# Patient Record
Sex: FEMALE | Race: BLACK OR AFRICAN AMERICAN | Employment: OTHER | URBAN - METROPOLITAN AREA
[De-identification: names, ages, dates, MRNs, and addresses within clinical notes are randomized per-mention and may not be internally consistent; named-entity substitution may affect disease eponyms.]

---

## 2022-11-21 ENCOUNTER — HOSPITAL ENCOUNTER (EMERGENCY)
Age: 37
Discharge: HOME | End: 2022-11-22
Attending: STUDENT IN AN ORGANIZED HEALTH CARE EDUCATION/TRAINING PROGRAM

## 2022-11-21 ENCOUNTER — APPOINTMENT (OUTPATIENT)
Dept: ULTRASOUND IMAGING | Age: 37
End: 2022-11-21
Attending: PHYSICIAN ASSISTANT

## 2022-11-21 VITALS
BODY MASS INDEX: 28.62 KG/M2 | TEMPERATURE: 97.3 F | OXYGEN SATURATION: 100 % | HEIGHT: 67 IN | RESPIRATION RATE: 18 BRPM | WEIGHT: 182.32 LBS | SYSTOLIC BLOOD PRESSURE: 134 MMHG | HEART RATE: 61 BPM | DIASTOLIC BLOOD PRESSURE: 83 MMHG

## 2022-11-21 DIAGNOSIS — D25.9 UTERINE LEIOMYOMA, UNSPECIFIED LOCATION: ICD-10-CM

## 2022-11-21 DIAGNOSIS — R10.817 GENERALIZED ABDOMINAL TENDERNESS WITHOUT REBOUND TENDERNESS: Primary | ICD-10-CM

## 2022-11-21 LAB
BASOPHILS # BLD: 0 K/UL (ref 0–0.1)
BASOPHILS NFR BLD: 1 % (ref 0–1)
COMMENT, HOLDF: NORMAL
DIFFERENTIAL METHOD BLD: ABNORMAL
EOSINOPHIL # BLD: 0.3 K/UL (ref 0–0.4)
EOSINOPHIL NFR BLD: 5 % (ref 0–7)
ERYTHROCYTE [DISTWIDTH] IN BLOOD BY AUTOMATED COUNT: 14.8 % (ref 11.5–14.5)
HCG UR QL: NEGATIVE
HCT VFR BLD AUTO: 31.3 % (ref 35–47)
HGB BLD-MCNC: 9.7 G/DL (ref 11.5–16)
IMM GRANULOCYTES # BLD AUTO: 0 K/UL (ref 0–0.04)
IMM GRANULOCYTES NFR BLD AUTO: 0 % (ref 0–0.5)
LYMPHOCYTES # BLD: 2.4 K/UL (ref 0.8–3.5)
LYMPHOCYTES NFR BLD: 38 % (ref 12–49)
MCH RBC QN AUTO: 26.7 PG (ref 26–34)
MCHC RBC AUTO-ENTMCNC: 31 G/DL (ref 30–36.5)
MCV RBC AUTO: 86.2 FL (ref 80–99)
MONOCYTES # BLD: 0.4 K/UL (ref 0–1)
MONOCYTES NFR BLD: 6 % (ref 5–13)
NEUTS SEG # BLD: 3.1 K/UL (ref 1.8–8)
NEUTS SEG NFR BLD: 50 % (ref 32–75)
NRBC # BLD: 0 K/UL (ref 0–0.01)
NRBC BLD-RTO: 0 PER 100 WBC
PLATELET # BLD AUTO: 250 K/UL (ref 150–400)
PMV BLD AUTO: 12.3 FL (ref 8.9–12.9)
RBC # BLD AUTO: 3.63 M/UL (ref 3.8–5.2)
SAMPLES BEING HELD,HOLD: NORMAL
UR CULT HOLD, URHOLD: NORMAL
WBC # BLD AUTO: 6.3 K/UL (ref 3.6–11)

## 2022-11-21 PROCEDURE — 85025 COMPLETE CBC W/AUTO DIFF WBC: CPT

## 2022-11-21 PROCEDURE — 81025 URINE PREGNANCY TEST: CPT

## 2022-11-21 PROCEDURE — 81001 URINALYSIS AUTO W/SCOPE: CPT

## 2022-11-21 PROCEDURE — 76856 US EXAM PELVIC COMPLETE: CPT

## 2022-11-21 PROCEDURE — 80053 COMPREHEN METABOLIC PANEL: CPT

## 2022-11-21 PROCEDURE — 99285 EMERGENCY DEPT VISIT HI MDM: CPT

## 2022-11-22 ENCOUNTER — APPOINTMENT (OUTPATIENT)
Dept: CT IMAGING | Age: 37
End: 2022-11-22
Attending: PHYSICIAN ASSISTANT

## 2022-11-22 LAB
ALBUMIN SERPL-MCNC: 3.5 G/DL (ref 3.5–5)
ALBUMIN/GLOB SERPL: 0.9 {RATIO} (ref 1.1–2.2)
ALP SERPL-CCNC: 61 U/L (ref 45–117)
ALT SERPL-CCNC: 13 U/L (ref 12–78)
ANION GAP SERPL CALC-SCNC: 6 MMOL/L (ref 5–15)
APPEARANCE UR: CLEAR
AST SERPL-CCNC: 14 U/L (ref 15–37)
BACTERIA URNS QL MICRO: NEGATIVE /HPF
BILIRUB SERPL-MCNC: 0.4 MG/DL (ref 0.2–1)
BILIRUB UR QL: NEGATIVE
BUN SERPL-MCNC: 7 MG/DL (ref 6–20)
BUN/CREAT SERPL: 10 (ref 12–20)
CALCIUM SERPL-MCNC: 8.9 MG/DL (ref 8.5–10.1)
CHLORIDE SERPL-SCNC: 110 MMOL/L (ref 97–108)
CLUE CELLS VAG QL WET PREP: NORMAL
CO2 SERPL-SCNC: 25 MMOL/L (ref 21–32)
COLOR UR: ABNORMAL
CREAT SERPL-MCNC: 0.7 MG/DL (ref 0.55–1.02)
EPITH CASTS URNS QL MICRO: ABNORMAL /LPF
GLOBULIN SER CALC-MCNC: 3.8 G/DL (ref 2–4)
GLUCOSE SERPL-MCNC: 99 MG/DL (ref 65–100)
GLUCOSE UR STRIP.AUTO-MCNC: NEGATIVE MG/DL
HGB UR QL STRIP: ABNORMAL
HYALINE CASTS URNS QL MICRO: ABNORMAL /LPF (ref 0–5)
KETONES UR QL STRIP.AUTO: ABNORMAL MG/DL
LEUKOCYTE ESTERASE UR QL STRIP.AUTO: NEGATIVE
NITRITE UR QL STRIP.AUTO: NEGATIVE
PH UR STRIP: 5 [PH] (ref 5–8)
POTASSIUM SERPL-SCNC: 3.6 MMOL/L (ref 3.5–5.1)
PROT SERPL-MCNC: 7.3 G/DL (ref 6.4–8.2)
PROT UR STRIP-MCNC: NEGATIVE MG/DL
RBC #/AREA URNS HPF: ABNORMAL /HPF (ref 0–5)
SODIUM SERPL-SCNC: 141 MMOL/L (ref 136–145)
SP GR UR REFRACTOMETRY: 1.03
T VAGINALIS VAG QL WET PREP: NORMAL
UROBILINOGEN UR QL STRIP.AUTO: 0.2 EU/DL (ref 0.2–1)
WBC URNS QL MICRO: ABNORMAL /HPF (ref 0–4)

## 2022-11-22 PROCEDURE — 87491 CHLMYD TRACH DNA AMP PROBE: CPT

## 2022-11-22 PROCEDURE — 87210 SMEAR WET MOUNT SALINE/INK: CPT

## 2022-11-22 PROCEDURE — 74011000636 HC RX REV CODE- 636: Performed by: RADIOLOGY

## 2022-11-22 PROCEDURE — 74177 CT ABD & PELVIS W/CONTRAST: CPT

## 2022-11-22 RX ORDER — IBUPROFEN 800 MG/1
800 TABLET ORAL
Qty: 20 TABLET | Refills: 0 | Status: SHIPPED | OUTPATIENT
Start: 2022-11-22 | End: 2022-11-22 | Stop reason: SDUPTHER

## 2022-11-22 RX ORDER — IBUPROFEN 800 MG/1
800 TABLET ORAL
Qty: 20 TABLET | Refills: 0 | Status: SHIPPED | OUTPATIENT
Start: 2022-11-22 | End: 2022-11-29

## 2022-11-22 RX ORDER — DICYCLOMINE HYDROCHLORIDE 20 MG/1
20 TABLET ORAL
Qty: 15 TABLET | Refills: 0 | Status: SHIPPED | OUTPATIENT
Start: 2022-11-22

## 2022-11-22 RX ORDER — DICYCLOMINE HYDROCHLORIDE 20 MG/1
20 TABLET ORAL
Qty: 15 TABLET | Refills: 0 | Status: SHIPPED | OUTPATIENT
Start: 2022-11-22 | End: 2022-11-22 | Stop reason: SDUPTHER

## 2022-11-22 RX ADMIN — IOPAMIDOL 100 ML: 755 INJECTION, SOLUTION INTRAVENOUS at 00:11

## 2022-11-22 NOTE — ED TRIAGE NOTES
Pt arrives ambulatory with back, flank, and pelvic pain x 1 year after doing an \"spiritual energy ritual\".  Pt states \"the energy guides  and my mom told me to come to the emergency room today for the pain\"

## 2022-11-22 NOTE — ED PROVIDER NOTES
44year old F presenting for \"I'm feeling a lot of pain in my vagina\", also pain across the lower back. \"It's been a long time,\" estimates \"a year. \" Every day. Feels cold sometimes. No vaginal bleeding.  + discharge, notes she had a odor that has now resolved. Sometimes has dysuria. No skin lesions or blisters, thinks sometimes has an ingrown hairs. Has not seen a gynecologist, last visit was about 3 years ago. No nausea or vomiting. Has not taken any medications, sometimes takes vitamins and \"gut cleaning\" supplements. Pt notes that in the beginning she was constipated. Denies concern for STI. PMHx: denies  PSx: denies  Social: non-smoker. Homecare  NK[facility]    The history is provided by the patient. Back Pain   Associated symptoms include abdominal pain and pelvic pain. Pertinent negatives include no chest pain and no fever. Pelvic Pain   Associated symptoms include back pain. Pertinent negatives include no fever, no vomiting and no chest pain. No past medical history on file. No past surgical history on file. No family history on file.     Social History     Socioeconomic History    Marital status: SINGLE     Spouse name: Not on file    Number of children: Not on file    Years of education: Not on file    Highest education level: Not on file   Occupational History    Not on file   Tobacco Use    Smoking status: Never    Smokeless tobacco: Not on file   Substance and Sexual Activity    Alcohol use: No    Drug use: Not on file    Sexual activity: Not on file   Other Topics Concern    Not on file   Social History Narrative    Not on file     Social Determinants of Health     Financial Resource Strain: Not on file   Food Insecurity: Not on file   Transportation Needs: Not on file   Physical Activity: Not on file   Stress: Not on file   Social Connections: Not on file   Intimate Partner Violence: Not on file   Housing Stability: Not on file         ALLERGIES: Patient has no known allergies. Review of Systems   Constitutional:  Negative for fever. HENT:  Negative for facial swelling. Eyes:  Negative for visual disturbance. Respiratory:  Negative for shortness of breath. Cardiovascular:  Negative for chest pain. Gastrointestinal:  Positive for abdominal pain. Negative for vomiting. Genitourinary:  Positive for pelvic pain. Musculoskeletal:  Positive for back pain. Skin:  Negative for wound. Neurological:  Negative for syncope. All other systems reviewed and are negative. Vitals:    11/21/22 2049   BP: 134/83   Pulse: 61   Resp: 18   Temp: 97.3 °F (36.3 °C)   SpO2: 100%   Weight: 82.7 kg (182 lb 5.1 oz)   Height: 5' 7\" (1.702 m)            Physical Exam  Vitals and nursing note reviewed. Constitutional:       General: She is not in acute distress. Appearance: She is well-developed. Comments: Pleasant, well-appearing, no distress   HENT:      Head: Normocephalic and atraumatic. Right Ear: External ear normal.      Left Ear: External ear normal.   Eyes:      General: No scleral icterus. Conjunctiva/sclera: Conjunctivae normal.   Neck:      Trachea: No tracheal deviation. Cardiovascular:      Rate and Rhythm: Normal rate and regular rhythm. Heart sounds: Normal heart sounds. No murmur heard. No friction rub. No gallop. Pulmonary:      Effort: Pulmonary effort is normal. No respiratory distress. Breath sounds: Normal breath sounds. No stridor. No wheezing. Abdominal:      General: There is no distension. Palpations: Abdomen is soft. Comments: Generalized tenderness, more so in the lower abdomen  No CVA tenderness   Genitourinary:     Comments: Normal external genitalia  Unable to tolerate speculum exam, \"I just keep tensing up\"  No cervical motion tenderness on bimanual  Musculoskeletal:         General: Normal range of motion. Cervical back: Neck supple. Skin:     General: Skin is warm and dry.    Neurological: Mental Status: She is alert and oriented to person, place, and time. Psychiatric:         Behavior: Behavior normal.        MDM  Number of Diagnoses or Management Options  Diagnosis management comments: 68-year-old female presenting to the ED for at least 1 year history of intermittent pelvic pain as well as intermittent generalized abdominal pain. Poor visualization of uterus/ovaries on ultrasound, CT scan showing a fibroid. No other findings. Reassuring UA and labs, hCG negative. Discussed with patient that uterine fibroid could certainly contribute to pelvic pain, recommended GYN follow-up, return precautions given. Patient's roommate also noted that she would like for patient to get resources for depression if possible, patient denies SI, states that she would be very happy to receive resources.        Amount and/or Complexity of Data Reviewed  Clinical lab tests: ordered and reviewed  Tests in the radiology section of CPT®: ordered and reviewed  Discuss the patient with other providers: yes (Dr. Cinthia Bernardo ED attending)           Procedures

## 2022-11-22 NOTE — BSMART NOTE
Writer provider Pt with resources for [facility] same day access, counseling, and psychiatry upon ED, Provider's request. No formal consult requested.  Pt denied SI/HI

## 2022-11-22 NOTE — DISCHARGE INSTRUCTIONS
Return for new or worsening symptoms as discussed. Your CT scan tonight showed a uterine fibroid which could certainly be contributing to your pelvic pain and some of your other symptoms. It is important that you call make a follow-up appointment with gynecology.

## 2022-11-25 LAB
C TRACH RRNA SPEC QL NAA+PROBE: NEGATIVE
KOH PREP SPEC: NORMAL
N GONORRHOEA RRNA SPEC QL NAA+PROBE: NEGATIVE
SERVICE CMNT-IMP: NORMAL
SPECIMEN SOURCE: NORMAL

## 2022-12-01 ENCOUNTER — PATIENT MESSAGE (OUTPATIENT)
Dept: OBGYN CLINIC | Age: 37
End: 2022-12-01

## 2023-01-03 ENCOUNTER — OFFICE VISIT (OUTPATIENT)
Dept: OBGYN CLINIC | Age: 38
End: 2023-01-03
Payer: COMMERCIAL

## 2023-01-03 VITALS — DIASTOLIC BLOOD PRESSURE: 76 MMHG | WEIGHT: 182 LBS | BODY MASS INDEX: 28.51 KG/M2 | SYSTOLIC BLOOD PRESSURE: 130 MMHG

## 2023-01-03 DIAGNOSIS — D25.9 UTERINE LEIOMYOMA, UNSPECIFIED LOCATION: ICD-10-CM

## 2023-01-03 DIAGNOSIS — B37.31 VAGINAL CANDIDIASIS: Primary | ICD-10-CM

## 2023-01-03 PROCEDURE — 99204 OFFICE O/P NEW MOD 45 MIN: CPT | Performed by: OBSTETRICS & GYNECOLOGY

## 2023-01-03 RX ORDER — FLUCONAZOLE 150 MG/1
TABLET ORAL
Qty: 2 TABLET | Refills: 0 | Status: SHIPPED | OUTPATIENT
Start: 2023-01-03

## 2023-01-03 NOTE — PROGRESS NOTES
GYN Problem Visit    Maicol Farias is a 44 y.o.  presenting for problem visit. Her main concern today is Fibroids. Patient was diagnosed with uterine fibroids on 11/21/22 at [facility].  Patient denies being in pain today. She was given hydrocodone in the ER. Patient states she took 7 days worth of hydrocodone. She does not have any hydrocodone left. Patient denies heavy menstrual bleeding. Patient states this is the first time she heard about having a fibroid. Patient had a CT scan that was evident for a 6.9 cm fibroid. Ultrasound was inconclusive. Ob/Gyn Hx:  G 0 -  LMP-last month, lasting 5 days. Patient denies heavy bleeding. Menses-q. monthly lasting 5 days  Contraception-none  SA-not currently        Past Medical History:   Diagnosis Date    Fibroid, uterine        History reviewed. No pertinent surgical history. History reviewed. No pertinent family history. Social History     Socioeconomic History    Marital status: SINGLE     Spouse name: Not on file    Number of children: Not on file    Years of education: Not on file    Highest education level: Not on file   Occupational History    Not on file   Tobacco Use    Smoking status: Never    Smokeless tobacco: Not on file   Substance and Sexual Activity    Alcohol use: No    Drug use: Not on file    Sexual activity: Not on file   Other Topics Concern    Not on file   Social History Narrative    Not on file     Social Determinants of Health     Financial Resource Strain: Not on file   Food Insecurity: Not on file   Transportation Needs: Not on file   Physical Activity: Not on file   Stress: Not on file   Social Connections: Not on file   Intimate Partner Violence: Not on file   Housing Stability: Not on file       Current Outpatient Medications   Medication Sig Dispense Refill    dicyclomine (BENTYL) 20 mg tablet Take 1 Tablet by mouth every six (6) hours as needed for Abdominal Cramps.  15 Tablet 0    amoxicillin (AMOXIL) 500 mg capsule Take 500 mg by mouth. HYDROcodone-acetaminophen (LORTAB) 5-500 mg per tablet Take 1 Tab by mouth every four (4) hours as needed for Pain.  20 Tab 0       No Known Allergies    Review of Systems - History obtained from the patient  Constitutional: negative for weight loss, fever, night sweats  HEENT: negative for hearing loss, earache, congestion, snoring, sorethroat  CV: negative for chest pain, palpitations, edema  Resp: negative for cough, shortness of breath, wheezing  GI: negative for change in bowel habits, abdominal pain, black or bloody stools  : negative for frequency, dysuria, hematuria, vaginal discharge  MSK: negative for back pain, joint pain, muscle pain  Breast: negative for breast lumps, nipple discharge, galactorrhea  Skin :negative for itching, rash, hives  Neuro: negative for dizziness, headache, confusion, weakness  Psych: negative for anxiety, depression, change in mood  Heme/lymph: negative for bleeding, bruising, pallor    Physical Exam    Visit Vitals  /76   Wt 182 lb (82.6 kg)   LMP  (LMP Unknown)   BMI 28.51 kg/m²         OBGyn Exam      Constitutional  Appearance: well-nourished, well developed, alert, in no acute distress    HENT  Head and Face: appears normal    Chest  Respiratory Effort: non-labored breathing    Cardiovascular  Extremities: no peripheral edema    Gastrointestinal  Abdominal Examination: abdomen non-distended, non-tender to palpation, no masses present  Liver and spleen: no hepatomegaly present, spleen not palpable  Hernias: no hernias identified    Genitourinary  External Genitalia: normal appearance for age, thick chunky discharge present, no tenderness present, no inflammatory lesions present, no masses present, no atrophy present  Vagina: normal vaginal vault without central or paravaginal defects, no discharge present, no inflammatory lesions present, no masses present  Bladder: non-tender to palpation  Urethra: appears normal  Cervix: normal   Uterus: slightly enlarged. Not palpated on abdomen. Adnexa: no adnexal tenderness present, no adnexal masses present  Perineum: perineum within normal limits, no evidence of trauma, no rashes or skin lesions present    Skin  General Inspection: no rash, no lesions identified    Neurologic/Psychiatric  Mental Status:  Orientation: grossly oriented to person, place and time  Mood and Affect: mood normal, affect appropriate      Assessment/Plan:  Hoda Hernandez IS A 44 y.o. para 0  1. Vaginal candidiasis  2. Uterine leiomyoma, unspecified location  Patient presents in the office today as a new patient to discuss uterine fibroids. Patient denies heavy bleeding or pain at this time. However, she did present to the ER initially with pain. Patient was given hydrocodone for 7 days. Patient states she took all 7 days. This was back in November. She has not required any pain medication since. We discussed treatment options for fibroid uterus which include expectant management, medication intervention and surgical intervention. We discussed the risk, benefits, indication, alternatives, side effects and possible complications of all treatment options. Patient does not desire intervention at this time. She does not have any children and she does not desire children. She is not currently sexually active. She denies bulk symptoms. We did review her CT scan with regard to her fibroid uterus. We reviewed the size of the fibroid. The fibroid is quite large at 6.9 cm. Again, patient desires expectant management. Physical exam is consistent with a vaginal yeast infection. We discussed the risks and possible side effects of Diflucan as patient took Monistat without relief. Diflucan was sent to her pharmacy today. Patient asked to return to the office should symptoms recur. Patient will return to office as needed or for next annual exam.         Dustin Irvin.  Sebastián Phillip MD